# Patient Record
Sex: MALE | Race: OTHER | NOT HISPANIC OR LATINO | URBAN - METROPOLITAN AREA
[De-identification: names, ages, dates, MRNs, and addresses within clinical notes are randomized per-mention and may not be internally consistent; named-entity substitution may affect disease eponyms.]

---

## 2020-08-24 ENCOUNTER — EMERGENCY (EMERGENCY)
Facility: HOSPITAL | Age: 30
LOS: 1 days | Discharge: HOME | End: 2020-08-24
Admitting: EMERGENCY MEDICINE
Payer: MEDICAID

## 2020-08-24 VITALS
TEMPERATURE: 98 F | DIASTOLIC BLOOD PRESSURE: 84 MMHG | OXYGEN SATURATION: 98 % | SYSTOLIC BLOOD PRESSURE: 123 MMHG | RESPIRATION RATE: 16 BRPM | HEART RATE: 79 BPM

## 2020-08-24 LAB — SARS-COV-2 RNA SPEC QL NAA+PROBE: SIGNIFICANT CHANGE UP

## 2020-08-24 PROCEDURE — 99283 EMERGENCY DEPT VISIT LOW MDM: CPT

## 2020-08-24 NOTE — ED PROVIDER NOTE - CLINICAL SUMMARY MEDICAL DECISION MAKING FREE TEXT BOX
Pt presents to ED for COVID-19 testing after suspected exposure at while at work. Pt asymptomatic. VS within normal range. Will order PCR testing and anticipating dc.

## 2020-08-24 NOTE — ED PROVIDER NOTE - PATIENT PORTAL LINK FT
You can access the FollowMyHealth Patient Portal offered by [facility] by registering at the following website: [email or web address]. By joining [employer]’s FollowMyHealth portal, you will also be able to view your health information using other applications (apps) compatible with our system.

## 2020-08-24 NOTE — ED PROVIDER NOTE - CHPI ED SYMPTOMS NEG
no cough, no SOB, no chest pain, no abdominal pain, no diarrhea/no chills/no fever/no vomiting/no nausea

## 2020-08-24 NOTE — ED PROVIDER NOTE - OBJECTIVE STATEMENT
30 y o male with no pertinent PMHX presents to ED requesting COVID-19 testing. Pt notes having exposure while at work and states he would like to be tested as precaution. No fevers, chills, cough, SOB, chest pain, abd pain, N/V/D, or other sx endorsed.

## 2020-08-28 DIAGNOSIS — Z20.828 CONTACT WITH AND (SUSPECTED) EXPOSURE TO OTHER VIRAL COMMUNICABLE DISEASES: ICD-10-CM

## 2022-11-14 NOTE — ED PROVIDER NOTE - SKIN, MLM
Repeat lactic sent to lamin Gutierres RN  11/14/22 1769 Skin normal color for race, warm, dry and intact. No evidence of rash.